# Patient Record
Sex: MALE | Race: WHITE
[De-identification: names, ages, dates, MRNs, and addresses within clinical notes are randomized per-mention and may not be internally consistent; named-entity substitution may affect disease eponyms.]

---

## 2015-11-19 VITALS — SYSTOLIC BLOOD PRESSURE: 118 MMHG | DIASTOLIC BLOOD PRESSURE: 88 MMHG

## 2019-01-09 ENCOUNTER — HOSPITAL ENCOUNTER (OUTPATIENT)
Dept: HOSPITAL 44 - RAD | Age: 49
End: 2019-01-09
Attending: CLINIC/CENTER
Payer: COMMERCIAL

## 2019-01-09 DIAGNOSIS — M79.671: Primary | ICD-10-CM

## 2019-01-09 PROCEDURE — 73630 X-RAY EXAM OF FOOT: CPT

## 2019-01-10 NOTE — DIAGNOSTIC IMAGING REPORT
KARLO MORALEZ (NP) - OP 

[facility]

[address] [facility].[address] [address]

 

 

 

 

Report Submission Date: 2019 4:59:40 PM CST

Patient       Study

Name:   MOMO HEALY       Date:   2019 3:00:41 PM CST

MRN:   [id number]       Modality Type:   DX

Gender:   M       Description:   LOWER EXTREMITY

:   74       Institution:   [facility]

Physician:   KARLO MORALEZ (SOCO) - OP

     Accession:    [id number]

 

 

Examination: Plain film right foot   



History:  FOOT PAIN (Hx) 



Findings: 3 views of the right foot demonstrates normal cortical margins. No 
fracture or dislocation.  No soft tissue swelling. No joint effusion. 



Impression: No acute osseous process.

 

Electronically signed on 2019 4:59:40 PM CST by:

Guero KESSLER